# Patient Record
Sex: FEMALE | Race: WHITE | HISPANIC OR LATINO | URBAN - METROPOLITAN AREA
[De-identification: names, ages, dates, MRNs, and addresses within clinical notes are randomized per-mention and may not be internally consistent; named-entity substitution may affect disease eponyms.]

---

## 2017-04-10 ENCOUNTER — APPOINTMENT (RX ONLY)
Dept: URBAN - METROPOLITAN AREA CLINIC 12 | Facility: CLINIC | Age: 57
Setting detail: DERMATOLOGY
End: 2017-04-10

## 2017-04-10 DIAGNOSIS — Z41.9 ENCOUNTER FOR PROCEDURE FOR PURPOSES OTHER THAN REMEDYING HEALTH STATE, UNSPECIFIED: ICD-10-CM

## 2017-04-10 PROCEDURE — ? COSMETIC CONSULTATION: COOLSCULPTING

## 2017-05-04 ENCOUNTER — APPOINTMENT (RX ONLY)
Dept: URBAN - METROPOLITAN AREA CLINIC 12 | Facility: CLINIC | Age: 57
Setting detail: DERMATOLOGY
End: 2017-05-04

## 2017-05-04 DIAGNOSIS — Z41.9 ENCOUNTER FOR PROCEDURE FOR PURPOSES OTHER THAN REMEDYING HEALTH STATE, UNSPECIFIED: ICD-10-CM

## 2017-05-04 PROCEDURE — ? COOLSCULPTING

## 2017-05-04 ASSESSMENT — LOCATION DETAILED DESCRIPTION DERM
LOCATION DETAILED: LEFT LATERAL ABDOMEN
LOCATION DETAILED: RIGHT LATERAL ABDOMEN
LOCATION DETAILED: EPIGASTRIC SKIN

## 2017-05-04 ASSESSMENT — LOCATION ZONE DERM: LOCATION ZONE: TRUNK

## 2017-05-04 ASSESSMENT — LOCATION SIMPLE DESCRIPTION DERM: LOCATION SIMPLE: ABDOMEN

## 2017-05-04 NOTE — PROCEDURE: COOLSCULPTING
Detail Level: Zone
Indication: non-invasive fat removal
Price (Use Numbers Only, No Special Characters Or $): 1800.00
Device: Zeltiq CoolSculpt
Post Treatment: Provided post care instructions.
Post-Care Instructions: Provided post care instructions and was instructed to call with any questions or concerns prior to the 3 month follow up appointment for pictures.
Location: upper abdomen (right)
Applicators: Cool 6.2
Time (Minutes): 60
Suction Settings: The suction settings were set at 60/75/60.  Post hand piece removal, massage was done post hand piece removal for 2-3 minutes.
Time (Minutes): 60
Location: upper abdomen (left)
Suction Settings: The suction settings were set at 60/75/60.  Post hand piece removal, massage was done post hand piece removal for 2-3 minutes.
Applicators: Cool 6.2
Location: lower abdomen (right)
Applicators: CoolCore
Location: lower abdomen (left)

## 2017-05-25 ENCOUNTER — APPOINTMENT (RX ONLY)
Dept: URBAN - METROPOLITAN AREA CLINIC 12 | Facility: CLINIC | Age: 57
Setting detail: DERMATOLOGY
End: 2017-05-25

## 2017-05-25 DIAGNOSIS — Z41.9 ENCOUNTER FOR PROCEDURE FOR PURPOSES OTHER THAN REMEDYING HEALTH STATE, UNSPECIFIED: ICD-10-CM
